# Patient Record
Sex: MALE | Race: WHITE | NOT HISPANIC OR LATINO | Employment: UNEMPLOYED | URBAN - METROPOLITAN AREA
[De-identification: names, ages, dates, MRNs, and addresses within clinical notes are randomized per-mention and may not be internally consistent; named-entity substitution may affect disease eponyms.]

---

## 2023-11-01 ENCOUNTER — TELEPHONE (OUTPATIENT)
Dept: DERMATOLOGY | Facility: CLINIC | Age: 63
End: 2023-11-01
Payer: MEDICAID

## 2023-11-01 NOTE — TELEPHONE ENCOUNTER
----- Message from Kimberlee Harden LPN sent at 11/1/2023  3:06 PM CDT -----  Regarding: FW: Request for appointment for pt by Ivette with [facility] - for MOHS multiple locations for nose, and reconstruction, please advise  Contact: @[phone]    ----- Message -----  From: Cris Kim  Sent: 11/1/2023   1:39 PM CDT  To: Casi Oseguera S Staff  Subject: Request for appointment for pt by Ivette #    Regarding:Request for appointment for pt by Ivette with [facility] - for MOHS multiple locations for nose, and reconstruction, please advise    Contact: Ivette    Type: Call with appt options, recommendations, patient has Medicaid, Medicaid is down right now, will try to update when possible.    Who Called: Ivette    Would the patient rather a call back or a response via [facility]? Phone call    Best Call Back Number: [phone] no ext.  Fax: [phone]      Provided fax to Ivette [phone]

## 2023-11-01 NOTE — TELEPHONE ENCOUNTER
Called [facility] and let them know that Dr. Lance does not accept Medicaid. Gave them number to [place] office to see if Dr. Cox takes it.

## 2023-11-02 ENCOUNTER — TELEPHONE (OUTPATIENT)
Dept: DERMATOLOGY | Facility: CLINIC | Age: 63
End: 2023-11-02
Payer: MEDICAID

## 2023-11-02 ENCOUNTER — TELEPHONE (OUTPATIENT)
Dept: PLASTIC SURGERY | Facility: CLINIC | Age: 63
End: 2023-11-02
Payer: MEDICAID

## 2023-11-02 NOTE — TELEPHONE ENCOUNTER
Spoke to Ivette from [facility] and informed her that [facility] does not accept Medicaid. Called to let her know that another message was sent over to our clinic in regards to referral. Ivette stated that it was not meant for our clinic. She appreciated the call back.

## 2023-11-02 NOTE — TELEPHONE ENCOUNTER
----- Message from Jessica Chen sent at 11/2/2023 12:30 PM CDT -----  Contact: Dr Jerardo Garcia-FAX  11/2/23    Referral sent for pt, DX MOHs repair, scanned into media mgr.      Thanks    Jessica LONG

## 2023-11-02 NOTE — TELEPHONE ENCOUNTER
In basket message received to follow up on if referral was received.  Contact call to office [phone].  Notification to office that referral has not been seen or received.  Office is re-sending information to Dr Lance and Dr Ordonez for Mohs evaluation.  Provided fax numbers to office staff.

## 2023-11-06 ENCOUNTER — TELEPHONE (OUTPATIENT)
Dept: ADMINISTRATIVE | Facility: HOSPITAL | Age: 63
End: 2023-11-06
Payer: MEDICAID

## 2023-11-06 ENCOUNTER — TELEPHONE (OUTPATIENT)
Dept: DERMATOLOGY | Facility: CLINIC | Age: 63
End: 2023-11-06
Payer: MEDICAID

## 2023-11-08 ENCOUNTER — TELEPHONE (OUTPATIENT)
Dept: DERMATOLOGY | Facility: CLINIC | Age: 63
End: 2023-11-08
Payer: MEDICAID

## 2023-11-08 DIAGNOSIS — C44.311 BASAL CELL CARCINOMA (BCC) OF SKIN OF NOSE: Primary | ICD-10-CM

## 2023-11-09 ENCOUNTER — TELEPHONE (OUTPATIENT)
Dept: DERMATOLOGY | Facility: CLINIC | Age: 63
End: 2023-11-09
Payer: MEDICAID

## 2023-11-10 ENCOUNTER — TELEPHONE (OUTPATIENT)
Dept: DERMATOLOGY | Facility: CLINIC | Age: 63
End: 2023-11-10
Payer: MEDICAID

## 2023-11-10 NOTE — TELEPHONE ENCOUNTER
----- Message from Hollie Vaca sent at 11/10/2023  9:12 AM CST -----  Contact: self  Type:  Patient Returning Call    Who Called:  Patient  Who Left Message for Patient:  Kanchan  Does the patient know what this is regarding?:  n/a  Best Call Back Number:  [phone]    Additional Information:  Pt is ret a call.Please call back

## 2023-11-13 ENCOUNTER — TELEPHONE (OUTPATIENT)
Dept: DERMATOLOGY | Facility: CLINIC | Age: 63
End: 2023-11-13
Payer: MEDICAID

## 2023-11-13 NOTE — TELEPHONE ENCOUNTER
----- Message from Angela Nieves MA sent at 11/13/2023  8:34 AM CST -----    ----- Message -----  From: Roberth Alanis  Sent: 11/10/2023   3:14 PM CST  To: [facility] Clinical Staff    Type:  Patient Returning Call    Who Called:  Patient  Who Left Message for Patient:  Rosy  Does the patient know what this is regarding?:  Appointment  Best Call Back Number:  [phone]  Additional Information:

## 2023-11-28 DIAGNOSIS — C44.311 BASAL CELL CARCINOMA (BCC) OF SKIN OF NOSE: ICD-10-CM

## 2023-11-28 DIAGNOSIS — C44.311 BASAL CELL CARCINOMA (BCC) OF SUPRATIP OF NOSE: Primary | ICD-10-CM

## 2024-01-02 ENCOUNTER — TELEPHONE (OUTPATIENT)
Dept: DERMATOLOGY | Facility: CLINIC | Age: 64
End: 2024-01-02
Payer: MEDICAID

## 2024-01-03 ENCOUNTER — PROCEDURE VISIT (OUTPATIENT)
Dept: DERMATOLOGY | Facility: CLINIC | Age: 64
End: 2024-01-03
Payer: MEDICAID

## 2024-01-03 VITALS
HEART RATE: 47 BPM | HEIGHT: 71 IN | WEIGHT: 180 LBS | BODY MASS INDEX: 25.2 KG/M2 | DIASTOLIC BLOOD PRESSURE: 80 MMHG | SYSTOLIC BLOOD PRESSURE: 140 MMHG

## 2024-01-03 DIAGNOSIS — C44.311 BASAL CELL CARCINOMA (BCC) OF SKIN OF NOSE: ICD-10-CM

## 2024-01-03 PROCEDURE — 14060 TIS TRNFR E/N/E/L 10 SQ CM/<: CPT | Mod: S$PBB,,, | Performed by: DERMATOLOGY

## 2024-01-03 PROCEDURE — 17311 MOHS 1 STAGE H/N/HF/G: CPT | Mod: PBBFAC,51,PO | Performed by: DERMATOLOGY

## 2024-01-03 PROCEDURE — 17311 MOHS 1 STAGE H/N/HF/G: CPT | Mod: S$PBB,51,, | Performed by: DERMATOLOGY

## 2024-01-03 PROCEDURE — 14060 TIS TRNFR E/N/E/L 10 SQ CM/<: CPT | Mod: PBBFAC,PO | Performed by: DERMATOLOGY

## 2024-01-03 RX ORDER — DOXYCYCLINE 100 MG/1
100 CAPSULE ORAL 2 TIMES DAILY
Qty: 14 CAPSULE | Refills: 0 | Status: SHIPPED | OUTPATIENT
Start: 2024-01-03 | End: 2024-01-10

## 2024-01-03 NOTE — LETTER
January 3, 2024    Jerardo Garcia MD  [address]  [place] [address]       [facility]  [address] [place] [address]  Phone: [phone]   Patient: Solomon Ordaz   MR Number: [id number]   YOB: 1960   Date of Visit: 1/3/2024       Dear Dr. Garcia:    Solomon Ordaz was seen at our office today for Mohs Micrographic surgery on the left nasal ala. The tumor was extirpated in one stage leaving a final defect of 1.7 x 0.7 cm. After discussion of the possible repair options and in order to achieve an excellent cosmetic and functional result, the wound was repaired with a V to Y advancement flap.                                                 The patient will return to our clinic in 7 days for suture removal/wound check.   They will then be returned fully to your care pending any further need for our services. Thank you for allowing us to participate in the care of this patient.      Isiah Monte MD FAAD

## 2024-01-03 NOTE — PROGRESS NOTES
MOHS MICROGRAPHIC SURGERY OPERATIVE NOTE  Name:  Solomon Ordaz  Date: 1/3/2024  Patient : 1960  Attending Surgeon: Isiah Monte MD  Assistants: Yenifer Ortiz - Surgical Technician  Anesthetic Agent: 1% Lidocaine with 1:200,000 epinephrine  Clinical Diagnosis: basal cell carcinoma   Operation: Mohs Micrographic Surgery  Location: Left nasal ala  Indications: Location in mask areas of face including central face, nose, eyelids, eyebrows, lips, chin, preauricular, temple, and ear.  Surgical Preparation: povidone-iodine     Description of Operation:  The nature and purpose of the procedure, associated risks and alternative treatments were explained to the patient in detail. All patient questions were answered completely. An informed operative consent and photography permit were obtained. The tumor location was then identified and marked with agreement by the patient of the correct location. The patient was positioned, prepped, and draped in the usual sterile manner. Local anesthesia was obtained with 1 cc(s) of 1% Lidocaine with 1:200,000 epinephrine. The lesion pre-operatively measures 0.5 by 0.4 cm.     1ST STAGE:  A 2+ mm rim of normal appearing skin was marked circumferentially around the lesion after scraping with a curette to define the margin. The area thus outlined was excised at a 45 degree angle. Hemostasis was obtained with electrodesiccation. The specimen was oriented, mapped, and subdivided into at least two sections. Sections were then chromacoded and submitted for horizontal frozen sections. The patient tolerated the procedure well and there were no complications. Upon microscopic examination of the processed horizontal frozen sections of this stage:  No residual tumor was noted.  Tumor type noted on stage 1: No tumor seen.     SUMMARY:  The tumor was extirpated in 1 Mohs Stage resulting in a final defect measuring 0.8 by 0.6 cm².   The final defect extended deep to subcutaneous  fat  The patient tolerated the procedure well and no complications were noted.       Isiah Monte MD    FLAP CLOSURE OF MOHS DEFECT OPERATIVE REPORT  Patient Name: Solomon Ordaz  Patient YOB: 1960  Date of procedure: 1/3/2024  Attending Surgeon: Isiah Monte MD FAAD  Anesthetic Agent: 1% Lidocaine with 1:200,000 epinephrine   Assistant: Yenifer Ortiz - Surgical Technician  Clinical Diagnosis: A 0.5 x 0.4 cm² defect after Mohs Micrographic Surgery  Location: Left nasal ala  Operation:  V to Y advancement flap (Myocutaneous pedicle advancement)   Surgical Preparation: povidone-iodine    Description of Operation:  The nature and purpose of the procedure, associated risks and alternative treatments were explained to the patient in detail. All patient questions were answered completely. In order to minimize tension on the closure and avoid compromising the anatomic contour of the anatomic region and maximize functional capacity, the above noted adjacent tissue transfer was performed.    An informed operative consent and photography permit were obtained. The patient was positioned, prepped, and draped in the usual sterile manner. Local anesthesia was obtained with 3 cc(s) of 1% Lidocaine with 1:200,000 epinephrine The defect edges were debeveled with a #15 blade. Using gentian violet, the flap was designed adjacent to the defect including all anticipated dog ears. The area thus outlined was incised deep to subcutaneous fat with the scalpel. This resulted in a final undermined and elevated flap defect measuring 1.7 x 0.7 cm².   The flap and skin margins were then undermined 50 to 75% of the defects diameter in all directions utilizing supercut iris scissors. Hemostasis was achieved with electrodessication. The secondary defect was closed first utilizing 4-0 Vicryl interrupted buried subcutaneous vertical mattress sutures.     The adjacent tissue flap was then mobilized into place and  anchored with additional 4-0 Vicryl interrupted buried subcutaneous vertical mattress sutures. The flap and recipient sites were sculpted in the adipose and dermal planes for a good fit. The skin edges were then reapposed with 5-0 Nylon. Redundant tissue was noted at the inferior and superior aspect of the adjacent tissue transfer. Burows triangles were removed utilizing a #15 blade and the epidermal edges reapposed with 5-0 Nylon. This repair resulted in excellent contour with normal symmetry. The patient tolerated the procedure well and there were no complications.   Polysporin, non-adherent gauze and a pressure dressing were applied to wound after gentle cleansing with saline.    Post op meds: Doxycycline 100 BID x 7 days     Photos:      MD REMIGIO Silva

## 2024-01-03 NOTE — LETTER
December 21, 2023        Jerardo Garcia MD  [address] [facility]  [place] [address]             [facility]  [address] [place] [address]  Phone: [phone]   Patient: Solomon Ordaz   MR Number: [id number]   YOB: 1960   Date of Visit: 1/3/2024       Dear Dr. Garcia     Solomon Ordaz has been scheduled for Mohs surgery with us for the basal cell carcinoma  on the left nasal ala. Patient is scheduled on 1\3\24. If you have any questions, please give us a call at [phone] or please contact Dr. Monte directly on his cell phone at [phone]. Thank you for the opportunity to participate in the care of this patient.      - [facility] Surgery Team

## 2024-01-10 ENCOUNTER — PROCEDURE VISIT (OUTPATIENT)
Dept: DERMATOLOGY | Facility: CLINIC | Age: 64
End: 2024-01-10
Payer: MEDICAID

## 2024-01-10 ENCOUNTER — TELEPHONE (OUTPATIENT)
Dept: DERMATOLOGY | Facility: CLINIC | Age: 64
End: 2024-01-10
Payer: MEDICAID

## 2024-01-10 VITALS
WEIGHT: 179.88 LBS | SYSTOLIC BLOOD PRESSURE: 130 MMHG | HEART RATE: 59 BPM | DIASTOLIC BLOOD PRESSURE: 86 MMHG | BODY MASS INDEX: 25.18 KG/M2 | HEIGHT: 71 IN

## 2024-01-10 DIAGNOSIS — C44.311 BASAL CELL CARCINOMA (BCC) OF SKIN OF NOSE: ICD-10-CM

## 2024-01-10 PROCEDURE — 14060 TIS TRNFR E/N/E/L 10 SQ CM/<: CPT | Mod: S$PBB,79,51, | Performed by: DERMATOLOGY

## 2024-01-10 PROCEDURE — 17311 MOHS 1 STAGE H/N/HF/G: CPT | Mod: 76,PBBFAC,PO | Performed by: DERMATOLOGY

## 2024-01-10 PROCEDURE — 17312 MOHS ADDL STAGE: CPT | Mod: PBBFAC,PO | Performed by: DERMATOLOGY

## 2024-01-10 PROCEDURE — 14060 TIS TRNFR E/N/E/L 10 SQ CM/<: CPT | Mod: PBBFAC,PO | Performed by: DERMATOLOGY

## 2024-01-10 PROCEDURE — 17311 MOHS 1 STAGE H/N/HF/G: CPT | Mod: S$PBB,79,, | Performed by: DERMATOLOGY

## 2024-01-10 PROCEDURE — 17312 MOHS ADDL STAGE: CPT | Mod: S$PBB,,, | Performed by: DERMATOLOGY

## 2024-01-10 RX ORDER — DOXYCYCLINE 100 MG/1
100 CAPSULE ORAL 2 TIMES DAILY
Qty: 14 CAPSULE | Refills: 0 | Status: SHIPPED | OUTPATIENT
Start: 2024-01-10 | End: 2024-01-17

## 2024-01-10 NOTE — LETTER
January 11, 2024      Jerardo Garcia MD  [address]  [facility] [address]       [facility]  [address] [place] [address]  Phone: [phone]   Patient: Solomon Ordaz   MR Number: [id number]   YOB: 1960   Date of Visit: 1/10/2024       Dear Dr. GarciaSolomon was seen at our office today for Mohs Micrographic surgery on the nasal infratip and the nasal supratip. The tumor of the nasal infratip was extirpated in two stages leaving a final defect of 4.3 x 2.3 cm. The tumor of the nasal supratip was extirpated in one stage leaving a final defect of 4.3 x 2.3 cm. After discussion of the possible repair options and in order to achieve an excellent cosmetic and functional result, the wound was repaired with an advancement flap.                                          The patient will return to our clinic in seven days for suture removal/wound check.   They will then be returned fully to your care pending any further need for our services. Thank you for allowing us to participate in the care of this patient.    Isiah Monte MD [facility]

## 2024-01-10 NOTE — PROGRESS NOTES
MOHS MICROGRAPHIC SURGERY OPERATIVE NOTE  Name:  Solomon Ordaz  Date: 1/10/2024  Patient : 1960  Attending Surgeon: Isiah Monte MD  Assistants: Yenifer Ortiz - Surgical Technician  Anesthetic Agent: 1% Lidocaine with 1:200,000 epinephrine  Clinical Diagnosis: basal cell carcinoma   Operation: Mohs Micrographic Surgery  Location: Nasal supratip  Indications: Location in mask areas of face including central face, nose, eyelids, eyebrows, lips, chin, preauricular, temple, and ear.  Surgical Preparation: povidone-iodine     Description of Operation:  The nature and purpose of the procedure, associated risks and alternative treatments were explained to the patient in detail. All patient questions were answered completely. An informed operative consent and photography permit were obtained. The tumor location was then identified and marked with agreement by the patient of the correct location. The patient was positioned, prepped, and draped in the usual sterile manner. Local anesthesia was obtained with 3 cc(s) of 1% Lidocaine with 1:200,000 epinephrine. The lesion pre-operatively measures 0.8 by 0.6 cm.     1ST STAGE:  A 2+ mm rim of normal appearing skin was marked circumferentially around the lesion after scraping with a curette to define the margin. The area thus outlined was excised at a 45 degree angle. Hemostasis was obtained with electrodesiccation. The specimen was oriented, mapped, and subdivided into at least two sections. Sections were then chromacoded and submitted for horizontal frozen sections. The patient tolerated the procedure well and there were no complications. Upon microscopic examination of the processed horizontal frozen sections of this stage:  No residual tumor was noted.  Tumor type noted on stage 1: No tumor seen.    SUMMARY:  The tumor was extirpated in 1 Mohs Stage resulting in a final total defect measuring 1.8 by 1.5 cm².   The final defect extended deep to subcutaneous  fat  The patient tolerated the procedure well and no complications were noted.     PHOTOS:      Isiah Monte MD    MOHS MICROGRAPHIC SURGERY OPERATIVE NOTE  Name:  Solomon Ordaz  Date: 1/10/2024  Patient : 1960  Attending Surgeon: Isiah Monte MD  Assistants: Yenifer Ortiz - Surgical Technician  Anesthetic Agent: 1% Lidocaine with 1:200,000 epinephrine  Clinical Diagnosis: basal cell carcinoma   Operation: Mohs Micrographic Surgery  Location: Nasal infratip  Indications: Location in mask areas of face including central face, nose, eyelids, eyebrows, lips, chin, preauricular, temple, and ear.  Surgical Preparation: povidone-iodine     Description of Operation:  The nature and purpose of the procedure, associated risks and alternative treatments were explained to the patient in detail. All patient questions were answered completely. An informed operative consent and photography permit were obtained. The tumor location was then identified and marked with agreement by the patient of the correct location. The patient was positioned, prepped, and draped in the usual sterile manner. Local anesthesia was obtained with 1 cc(s) of 1% Lidocaine with 1:200,000 epinephrine. The lesion pre-operatively measures 0.6 by 0.5 cm.     1ST STAGE:  A 2+ mm rim of normal appearing skin was marked circumferentially around the lesion after scraping with a curette to define the margin. The area thus outlined was excised at a 45 degree angle. Hemostasis was obtained with electrodesiccation. The specimen was oriented, mapped, and subdivided into at least two sections. Sections were then chromacoded and submitted for horizontal frozen sections. The patient tolerated the procedure well and there were no complications. Upon microscopic examination of the processed horizontal frozen sections of this stage:  Epidermal tissue was missing in the peripeheral margin of the specimen that was unable to be visualized despite deeper  tissue sections; an additional stage is indicated to rule out residual tumor.  Tumor type noted on stage 1: No tumor seen.     SUBSEQUENT STAGES:  The patient returned to the operating room for additional stages of tumor removal, and prepped in the manner described above. Surgery was directed to the areas having residual tumor, with thin layers of tissue being excised from these regions. A new reference map was prepared during the surgery to maintain precise orientation as described above. Hemostasis was achieved and the excised tissue was processed for microscopic analysis.  These sections were then examined by the Mohs surgeon, and areas of persistent tumor were indicated on the reference map. This process was repeated until the frozen horizontal sections of STAGE 2 revealed no further tumor cells and tumor eradication was considered to be  complete.  Tumor type noted on subsequent stages: No tumor seen.     SUMMARY:  The tumor was extirpated in 2 Mohs Stages resulting in a final total defect measuring 1.8 by 1.5 cm².   The final defect extended deep to subcutaneous fat  The patient tolerated the procedure well and no complications were noted.     PHOTOS:      Isiah Monte MD    FLAP CLOSURE OF MOHS DEFECT OPERATIVE REPORT  Patient Name: Solomon Ordaz  Patient YOB: 1960  Date of procedure: 1/10/2024  Attending Surgeon: Isiah Monte MD FAAD  Anesthetic Agent: 1% Lidocaine with 1:200,000 epinephrine   Assistant: Yenifer Ortiz - Surgical Technician  Clinical Diagnosis: A 1.8 x 1.5 cm² total defect after Mohs Micrographic Surgery  Location: Nasal supratip and nasal infratip  Operation:  East-West advancement flap   Surgical Preparation: povidone-iodine    Description of Operation:  The nature and purpose of the procedure, associated risks and alternative treatments were explained to the patient in detail. All patient questions were answered completely. In order to minimize tension on the  closure and avoid compromising the anatomic contour of the anatomic region and maximize functional capacity, the above noted adjacent tissue transfer was performed.    An informed operative consent and photography permit were obtained. The patient was positioned, prepped, and draped in the usual sterile manner. Local anesthesia was obtained with 4 cc(s) of 1% Lidocaine with 1:200,000 epinephrine The defect edges were debeveled with a #15 blade. Using gentian violet, the flap was designed adjacent to the defect including all anticipated dog ears. The area thus outlined was incised deep to subcutaneous fat with the scalpel. This resulted in a final undermined and elevated flap defect measuring 1.8 x 1.5 cm².   The flap and skin margins were then undermined 50 to 75% of the defects diameter in all directions utilizing supercut iris scissors. Hemostasis was achieved with electrodessication. The secondary defect was closed first utilizing 4-0 Monocryl interrupted buried subcutaneous vertical mattress sutures.     The adjacent tissue flap was then mobilized into place and anchored with additional 4-0 Monocryl interrupted buried subcutaneous vertical mattress sutures. The flap and recipient sites were sculpted in the adipose and dermal planes for a good fit. The skin edges were then reapposed with 5-0 Prolene. Redundant tissue was noted at the inferior and superior aspect of the adjacent tissue transfer. Burows triangles were removed utilizing a #15 blade and the epidermal edges reapposed with 5-0 Prolene. This repair resulted in excellent contour with normal symmetry. The patient tolerated the procedure well and there were no complications.   Polysporin, non-adherent gauze and a pressure dressing were applied to wound after gentle cleansing with saline.    Post op meds: Doxycycline 100 BID x 7 days     Photos:      Isiah Monte MD FAAD

## 2024-01-10 NOTE — TELEPHONE ENCOUNTER
Called patient to see if he could come in early, he said he would try to get here for 11:00 but or 12:00.

## 2024-01-17 ENCOUNTER — CLINICAL SUPPORT (OUTPATIENT)
Dept: DERMATOLOGY | Facility: CLINIC | Age: 64
End: 2024-01-17
Payer: MEDICAID

## 2024-01-17 DIAGNOSIS — Z48.02 VISIT FOR SUTURE REMOVAL: Primary | ICD-10-CM

## 2024-01-17 DIAGNOSIS — C44.311 BASAL CELL CARCINOMA (BCC) OF SUPRATIP OF NOSE: ICD-10-CM

## 2024-01-17 PROCEDURE — 99024 POSTOP FOLLOW-UP VISIT: CPT | Mod: ,,, | Performed by: DERMATOLOGY

## 2024-01-17 NOTE — PROGRESS NOTES
Mohs Surgery Suture Removal Note   Patient Name: Solomon Ordaz  Patient : 1960  Date of Service: 2024    CC: Pt. Presents for removal of sutures on the nasal supratip / nasal infratip today  Subjective: patient reports wound healing as expected     Objective: Wound well healed, sutures clean/dry/intact. No evidence of any complications noted.     Visit For Suture Removal:  - Suture removal today  - Steri strips/mastisol were not applied  - Patient counseled on silicone gel/silicone sheeting and their use in the management of post-operative scars  - Handout on silicone gel/silicone gel sheeting was provided  - Patient to follow up with their referring provider in 3 months for further skin evaluation    Monica Edge

## 2024-05-05 ENCOUNTER — HOSPITAL ENCOUNTER (EMERGENCY)
Facility: HOSPITAL | Age: 64
Discharge: HOME | End: 2024-05-05
Attending: EMERGENCY MEDICINE
Payer: MEDICAID

## 2024-05-05 VITALS
WEIGHT: 175 LBS | RESPIRATION RATE: 16 BRPM | HEART RATE: 56 BPM | OXYGEN SATURATION: 96 % | DIASTOLIC BLOOD PRESSURE: 91 MMHG | SYSTOLIC BLOOD PRESSURE: 164 MMHG | BODY MASS INDEX: 24.41 KG/M2 | TEMPERATURE: 98 F

## 2024-05-05 DIAGNOSIS — I10 HYPERTENSION, UNSPECIFIED TYPE: ICD-10-CM

## 2024-05-05 DIAGNOSIS — S91.001D WOUND OF RIGHT ANKLE, SUBSEQUENT ENCOUNTER: ICD-10-CM
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
## 2024-05-05 DIAGNOSIS — S91.002D WOUND OF LEFT ANKLE, SUBSEQUENT ENCOUNTER: ICD-10-CM

## 2024-05-05 PROCEDURE — 63600175 PHARM REV CODE 636 W HCPCS: Mod: ER | Performed by: PHYSICIAN ASSISTANT

## 2024-05-05 PROCEDURE — 90471 IMMUNIZATION ADMIN: CPT | Mod: ER | Performed by: PHYSICIAN ASSISTANT

## 2024-05-05 PROCEDURE — 90715 TDAP VACCINE 7 YRS/> IM: CPT | Mod: ER | Performed by: PHYSICIAN ASSISTANT

## 2024-05-05 PROCEDURE — 99284 EMERGENCY DEPT VISIT MOD MDM: CPT | Mod: 25,ER

## 2024-05-05 RX ADMIN — TETANUS TOXOID, REDUCED DIPHTHERIA TOXOID AND ACELLULAR PERTUSSIS VACCINE, ADSORBED 0.5 ML: 5; 2.5; 8; 8; 2.5 SUSPENSION INTRAMUSCULAR at 11:05

## 2024-05-05 NOTE — ED PROVIDER NOTES
Encounter Date: 5/5/2024       History     Chief Complaint   Patient presents with    Animal Bite     Pt states he was riding his bike last night and 2 dogs came up and bit both his ankles. Was seen at urgent care in [facility] but pt concerned bc his wounds were not dressed he wanted to be seen here. Noted wouds to bilateral outer ankle area, no active bleeding.      Patient is a 64-year-old male who presents to ED for dog bite wounds to bilateral ankles.  Patient was just seen at urgent care this morning at [facility], but per his paperwork, he was sent to the ED for rabies vaccination.    Patient reports that he was riding his bike last night in a local neighborhood when 2 dogs that were in the yard ran up to him when his bike and he was biting at his ankles.  Reports wounds to bilateral ankles, states that urgent care did not clean or dressed the wounds.  States that he did clean and immediately after it occurred when he got home.  Patient was not given a Tdap immunization at urgent care, he is not currently up-to-date.  Patient does not know if the animals are up-to-date on their rabies vaccination, states that he does not know the owners of the dog and will try to find out there rabies vaccination status.  Patient has not notified the police and urgent care also did not notify the 's office of the incident.  He was prescribed mupirocin and Augmentin from urgent care.  He has not yet picked up these prescriptions.        Review of patient's allergies indicates:  No Known Allergies  No past medical history on file.  No past surgical history on file.  No family history on file.  Social History     Tobacco Use    Smoking status: Every Day     Current packs/day: 0.50     Types: Cigarettes    Smokeless tobacco: Never     Review of Systems   Constitutional:  Negative for fever.   Respiratory:  Negative for shortness of breath.    Cardiovascular:  Negative for chest pain.   Musculoskeletal:  Positive for  arthralgias. Negative for myalgias.   Skin:  Positive for wound.   Neurological:  Negative for numbness.       Physical Exam     Initial Vitals [05/05/24 1021]   BP Pulse Resp Temp SpO2   (!) 164/91 (!) 56 16 97.6 °F (36.4 °C) 96 %      MAP       --         Physical Exam    Nursing note and vitals reviewed.  Constitutional: He appears well-developed and well-nourished. He is not diaphoretic.   Cardiovascular:  Normal rate and intact distal pulses.           Pulmonary/Chest: No respiratory distress.   Musculoskeletal:         General: No tenderness or edema. Normal range of motion.     Neurological: He is alert and oriented to person, place, and time. No sensory deficit.   Skin: Skin is warm. Capillary refill takes less than 2 seconds.   Left ankle - posterior to the lateral malleolus there is a superficial wound that appears to be a puncture with skin tear in a v shape. No active bleeding. No visible foreign body   Near the medial/posterior calcaneal region there is a puncture wound. No active bleeding.     Right ankle - lateral aspect - superficial abrasions.          ED Course   Procedures  Labs Reviewed - No data to display       Imaging Results    None          Medications   Tdap (BOOSTRIX) vaccine injection 0.5 mL (0.5 mLs Intramuscular Given 5/5/24 1103)     Medical Decision Making  Wounds were cleaned by RN with peroxide and copious saline. applied steri strip to wound on left ankle to loosely approximate the edges of the skin tear. No indication for further closure.   Updated tdap   Notifed [facility] of the incident.   In regards to rabies prophylaxis-patient was advised to contact the owner of the animals and find out there current rabies vaccination status.  If the dog is not up-to-date on rabies immunizations, animal control needs to be contacted for monitoring of the animal. If the status is unknown and patient is unable to find out the status of the animal, advised patient to please return to ED for rabies  series immunization. Patient voiced understanding and agreement with the plan of care.     Advised patient to take the antibiotics prescribed by urgent care and discussed wound management, monitoring for signs of infection. Patient voiced understanding and agreement with plan of care. All questions were answered.                                           Clinical Impression:  Final diagnoses:  [W54.0XXD] Dog bite, subsequent encounter (Primary)  [S91.002D] Wound of left ankle, subsequent encounter  [S91.001D] Wound of right ankle, subsequent encounter  [I10] Hypertension, unspecified type          ED Disposition Condition    Discharge Stable          ED Prescriptions    None       Follow-up Information       Follow up With Specialties Details Why Contact Info    Primary Care Physician  Schedule an appointment as soon as possible for a visit in 3 days For wound re-check              Hyacinth Saha PA-C  05/07/24 6790

## 2024-05-05 NOTE — DISCHARGE INSTRUCTIONS
Keep the wound clean and dry with soap and water.  Apply the mupirocin ointment that was prescribed at outside facility.  Monitor for any signs of infection.  Take the oral antibiotics as prescribed (by urgent care) to help prevent infection.    Your tetanus vaccination was updated today.    In regards to rabies prophylaxis-please contact the owner of the animals and find out there current rabies vaccination status.  Also [facility] was contacted today, they should be contacting you regarding this matter. If the dog is not up-to-date on rabies immunizations, animal control needs to be contacted for monitoring of the animal. If the status is unknown and you are unable to find out the status of the animal, please return to ED for rabies series immunization.